# Patient Record
Sex: MALE | Race: WHITE | NOT HISPANIC OR LATINO | URBAN - METROPOLITAN AREA
[De-identification: names, ages, dates, MRNs, and addresses within clinical notes are randomized per-mention and may not be internally consistent; named-entity substitution may affect disease eponyms.]

---

## 2018-11-12 ENCOUNTER — OFFICE (OUTPATIENT)
Dept: URBAN - METROPOLITAN AREA CLINIC 19 | Facility: CLINIC | Age: 63
End: 2018-11-12

## 2018-11-12 VITALS
SYSTOLIC BLOOD PRESSURE: 141 MMHG | DIASTOLIC BLOOD PRESSURE: 77 MMHG | WEIGHT: 275 LBS | HEART RATE: 87 BPM | HEIGHT: 74 IN

## 2018-11-12 DIAGNOSIS — R16.2 HEPATOMEGALY WITH SPLENOMEGALY, NOT ELSEWHERE CLASSIFIED: ICD-10-CM

## 2018-11-12 LAB
ACTIN (SMOOTH MUSCLE) ANTIBODY: 25 UNITS — HIGH (ref 0–19)
ALPHA-1-ANTITRYPSIN PHENOTYP: ALPHA-1-ANTITRYPSIN, SERUM: 166 MG/DL (ref 90–200)
ALPHA-1-ANTITRYPSIN PHENOTYP: PHENOTYPE (PI): (no result)
ANTINUCLEAR ANTIBODIES, IFA: NEGATIVE
CERULOPLASMIN: 27.6 MG/DL (ref 16–31)
FE+TIBC+FER: FERRITIN, SERUM: 912 NG/ML — HIGH (ref 30–400)
FE+TIBC+FER: IRON BIND.CAP.(TIBC): 232 UG/DL — LOW (ref 250–450)
FE+TIBC+FER: IRON SATURATION: 40 % (ref 15–55)
FE+TIBC+FER: IRON: 93 UG/DL (ref 38–169)
FE+TIBC+FER: UIBC: 139 UG/DL (ref 111–343)
HBSAG SCREEN: NEGATIVE
HCV ANTIBODY: HEP C VIRUS AB: <0.1 S/CO RATIO
HEPATIC FUNCTION PANEL (7): ALBUMIN: 4.5 G/DL (ref 3.6–4.8)
HEPATIC FUNCTION PANEL (7): ALKALINE PHOSPHATASE: 130 IU/L — HIGH (ref 39–117)
HEPATIC FUNCTION PANEL (7): ALT (SGPT): 17 IU/L (ref 0–44)
HEPATIC FUNCTION PANEL (7): AST (SGOT): 18 IU/L (ref 0–40)
HEPATIC FUNCTION PANEL (7): BILIRUBIN, DIRECT: 0.28 MG/DL (ref 0–0.4)
HEPATIC FUNCTION PANEL (7): BILIRUBIN, TOTAL: 0.5 MG/DL (ref 0–1.2)
HEPATIC FUNCTION PANEL (7): PROTEIN, TOTAL: 7.1 G/DL (ref 6–8.5)
PROTHROMBIN TIME (PT): INR: 1 (ref 0.8–1.2)
PROTHROMBIN TIME (PT): PROTHROMBIN TIME: 11.2 SEC (ref 9.1–12)

## 2018-11-12 PROCEDURE — 99213 OFFICE O/P EST LOW 20 MIN: CPT | Performed by: INTERNAL MEDICINE

## 2018-12-12 ENCOUNTER — OFFICE (OUTPATIENT)
Dept: URBAN - METROPOLITAN AREA CLINIC 14 | Facility: CLINIC | Age: 63
End: 2018-12-12

## 2018-12-12 VITALS — HEIGHT: 74 IN

## 2018-12-12 DIAGNOSIS — R16.2 HEPATOMEGALY WITH SPLENOMEGALY, NOT ELSEWHERE CLASSIFIED: ICD-10-CM

## 2018-12-12 PROCEDURE — 91200 LIVER ELASTOGRAPHY: CPT | Performed by: INTERNAL MEDICINE

## 2019-03-18 ENCOUNTER — OFFICE (OUTPATIENT)
Dept: URBAN - METROPOLITAN AREA CLINIC 19 | Facility: CLINIC | Age: 64
End: 2019-03-18

## 2019-03-18 VITALS
WEIGHT: 276 LBS | HEART RATE: 47 BPM | DIASTOLIC BLOOD PRESSURE: 71 MMHG | HEIGHT: 74 IN | SYSTOLIC BLOOD PRESSURE: 138 MMHG

## 2019-03-18 DIAGNOSIS — K92.1 MELENA: ICD-10-CM

## 2019-03-18 DIAGNOSIS — R16.2 HEPATOMEGALY WITH SPLENOMEGALY, NOT ELSEWHERE CLASSIFIED: ICD-10-CM

## 2019-03-18 LAB
CBC, PLATELET, NO DIFFERENTIAL: HEMATOCRIT: 39.7 % (ref 37.5–51)
CBC, PLATELET, NO DIFFERENTIAL: HEMOGLOBIN: 12.7 G/DL — LOW (ref 13–17.7)
CBC, PLATELET, NO DIFFERENTIAL: MCH: 29.5 PG (ref 26.6–33)
CBC, PLATELET, NO DIFFERENTIAL: MCHC: 32 G/DL (ref 31.5–35.7)
CBC, PLATELET, NO DIFFERENTIAL: MCV: 92 FL (ref 79–97)
CBC, PLATELET, NO DIFFERENTIAL: PLATELETS: 141 X10E3/UL — LOW (ref 150–379)
CBC, PLATELET, NO DIFFERENTIAL: RBC: 4.3 X10E6/UL (ref 4.14–5.8)
CBC, PLATELET, NO DIFFERENTIAL: RDW: 16.3 % — HIGH (ref 12.3–15.4)
CBC, PLATELET, NO DIFFERENTIAL: WBC: 10.9 X10E3/UL — HIGH (ref 3.4–10.8)
HEPATIC FUNCTION PANEL (7): ALBUMIN: 4.5 G/DL (ref 3.6–4.8)
HEPATIC FUNCTION PANEL (7): ALKALINE PHOSPHATASE: 147 IU/L — HIGH (ref 39–117)
HEPATIC FUNCTION PANEL (7): ALT (SGPT): 21 IU/L (ref 0–44)
HEPATIC FUNCTION PANEL (7): AST (SGOT): 19 IU/L (ref 0–40)
HEPATIC FUNCTION PANEL (7): BILIRUBIN, DIRECT: 0.34 MG/DL (ref 0–0.4)
HEPATIC FUNCTION PANEL (7): BILIRUBIN, TOTAL: 0.5 MG/DL (ref 0–1.2)
HEPATIC FUNCTION PANEL (7): PROTEIN, TOTAL: 7.5 G/DL (ref 6–8.5)

## 2019-03-18 PROCEDURE — 99213 OFFICE O/P EST LOW 20 MIN: CPT | Performed by: INTERNAL MEDICINE

## 2019-04-10 ENCOUNTER — AMBULATORY SURGICAL CENTER (OUTPATIENT)
Dept: URBAN - METROPOLITAN AREA SURGERY 2 | Facility: SURGERY | Age: 64
End: 2019-04-10

## 2019-04-10 ENCOUNTER — AMBULATORY SURGICAL CENTER (OUTPATIENT)
Dept: URBAN - METROPOLITAN AREA SURGERY 2 | Facility: SURGERY | Age: 64
End: 2019-04-10
Payer: MEDICARE

## 2019-04-10 VITALS
DIASTOLIC BLOOD PRESSURE: 43 MMHG | HEART RATE: 71 BPM | WEIGHT: 275 LBS | RESPIRATION RATE: 18 BRPM | RESPIRATION RATE: 18 BRPM | OXYGEN SATURATION: 95 % | OXYGEN SATURATION: 97 % | DIASTOLIC BLOOD PRESSURE: 43 MMHG | HEART RATE: 63 BPM | DIASTOLIC BLOOD PRESSURE: 51 MMHG | HEART RATE: 63 BPM | SYSTOLIC BLOOD PRESSURE: 111 MMHG | SYSTOLIC BLOOD PRESSURE: 111 MMHG | DIASTOLIC BLOOD PRESSURE: 50 MMHG | SYSTOLIC BLOOD PRESSURE: 102 MMHG | TEMPERATURE: 98.6 F | DIASTOLIC BLOOD PRESSURE: 51 MMHG | TEMPERATURE: 98.6 F | OXYGEN SATURATION: 97 % | SYSTOLIC BLOOD PRESSURE: 108 MMHG | OXYGEN SATURATION: 95 % | SYSTOLIC BLOOD PRESSURE: 102 MMHG | SYSTOLIC BLOOD PRESSURE: 124 MMHG | HEART RATE: 68 BPM | HEART RATE: 61 BPM | TEMPERATURE: 97.8 F | DIASTOLIC BLOOD PRESSURE: 50 MMHG | HEIGHT: 74 IN | HEART RATE: 71 BPM | HEART RATE: 61 BPM | RESPIRATION RATE: 16 BRPM | RESPIRATION RATE: 16 BRPM | HEIGHT: 74 IN | HEART RATE: 68 BPM | SYSTOLIC BLOOD PRESSURE: 124 MMHG | SYSTOLIC BLOOD PRESSURE: 108 MMHG | WEIGHT: 275 LBS | TEMPERATURE: 97.8 F

## 2019-04-10 DIAGNOSIS — K92.1 MELENA: ICD-10-CM

## 2019-04-10 DIAGNOSIS — K31.89 OTHER DISEASES OF STOMACH AND DUODENUM: ICD-10-CM

## 2019-04-10 PROCEDURE — 43235 EGD DIAGNOSTIC BRUSH WASH: CPT | Performed by: INTERNAL MEDICINE

## 2019-04-10 PROCEDURE — G8907 PT DOC NO EVENTS ON DISCHARG: HCPCS | Performed by: INTERNAL MEDICINE

## 2019-04-10 PROCEDURE — G8918 PT W/O PREOP ORDER IV AB PRO: HCPCS | Performed by: INTERNAL MEDICINE

## 2019-10-16 ENCOUNTER — OFFICE (OUTPATIENT)
Dept: URBAN - METROPOLITAN AREA CLINIC 19 | Facility: CLINIC | Age: 64
End: 2019-10-16

## 2019-10-16 VITALS
DIASTOLIC BLOOD PRESSURE: 60 MMHG | HEART RATE: 66 BPM | WEIGHT: 276 LBS | HEIGHT: 74 IN | SYSTOLIC BLOOD PRESSURE: 122 MMHG

## 2019-10-16 DIAGNOSIS — K74.60 UNSPECIFIED CIRRHOSIS OF LIVER: ICD-10-CM

## 2019-10-16 DIAGNOSIS — K92.1 MELENA: ICD-10-CM

## 2019-10-16 LAB
AFP, SERUM, TUMOR MARKER: 0.8 NG/ML (ref 0–8.3)
CBC WITH DIFFERENTIAL/PLATELET: BASO (ABSOLUTE): 0.1 X10E3/UL (ref 0–0.2)
CBC WITH DIFFERENTIAL/PLATELET: BASOS: 1 %
CBC WITH DIFFERENTIAL/PLATELET: EOS (ABSOLUTE): 0.2 X10E3/UL (ref 0–0.4)
CBC WITH DIFFERENTIAL/PLATELET: EOS: 2 %
CBC WITH DIFFERENTIAL/PLATELET: HEMATOCRIT: 35.2 % — LOW (ref 37.5–51)
CBC WITH DIFFERENTIAL/PLATELET: HEMOGLOBIN: 11.2 G/DL — LOW (ref 13–17.7)
CBC WITH DIFFERENTIAL/PLATELET: IMMATURE GRANS (ABS): 0 X10E3/UL (ref 0–0.1)
CBC WITH DIFFERENTIAL/PLATELET: IMMATURE GRANULOCYTES: 0 %
CBC WITH DIFFERENTIAL/PLATELET: LYMPHS (ABSOLUTE): 1.2 X10E3/UL (ref 0.7–3.1)
CBC WITH DIFFERENTIAL/PLATELET: LYMPHS: 15 %
CBC WITH DIFFERENTIAL/PLATELET: MCH: 29.9 PG (ref 26.6–33)
CBC WITH DIFFERENTIAL/PLATELET: MCHC: 31.8 G/DL (ref 31.5–35.7)
CBC WITH DIFFERENTIAL/PLATELET: MCV: 94 FL (ref 79–97)
CBC WITH DIFFERENTIAL/PLATELET: MONOCYTES(ABSOLUTE): 0.7 X10E3/UL (ref 0.1–0.9)
CBC WITH DIFFERENTIAL/PLATELET: MONOCYTES: 8 %
CBC WITH DIFFERENTIAL/PLATELET: NEUTROPHILS (ABSOLUTE): 5.9 X10E3/UL (ref 1.4–7)
CBC WITH DIFFERENTIAL/PLATELET: NEUTROPHILS: 74 %
CBC WITH DIFFERENTIAL/PLATELET: PLATELETS: 128 X10E3/UL — LOW (ref 150–450)
CBC WITH DIFFERENTIAL/PLATELET: RBC: 3.74 X10E6/UL — LOW (ref 4.14–5.8)
CBC WITH DIFFERENTIAL/PLATELET: RDW: 16.7 % — HIGH (ref 12.3–15.4)
CBC WITH DIFFERENTIAL/PLATELET: WBC: 8 X10E3/UL (ref 3.4–10.8)
HEPATIC FUNCTION PANEL (7): ALBUMIN: 3.9 G/DL (ref 3.6–4.8)
HEPATIC FUNCTION PANEL (7): ALKALINE PHOSPHATASE: 190 IU/L — HIGH (ref 39–117)
HEPATIC FUNCTION PANEL (7): ALT (SGPT): 14 IU/L (ref 0–44)
HEPATIC FUNCTION PANEL (7): AST (SGOT): 14 IU/L (ref 0–40)
HEPATIC FUNCTION PANEL (7): BILIRUBIN, DIRECT: 0.47 MG/DL — HIGH (ref 0–0.4)
HEPATIC FUNCTION PANEL (7): BILIRUBIN, TOTAL: 0.6 MG/DL (ref 0–1.2)
HEPATIC FUNCTION PANEL (7): PROTEIN, TOTAL: 6.5 G/DL (ref 6–8.5)
PROTHROMBIN TIME (PT): INR: 1.2 (ref 0.8–1.2)
PROTHROMBIN TIME (PT): PROTHROMBIN TIME: 11.9 SEC (ref 9.1–12)

## 2019-10-16 PROCEDURE — 99212 OFFICE O/P EST SF 10 MIN: CPT | Performed by: INTERNAL MEDICINE

## 2019-11-13 ENCOUNTER — OFFICE (OUTPATIENT)
Dept: URBAN - METROPOLITAN AREA CLINIC 19 | Facility: CLINIC | Age: 64
End: 2019-11-13

## 2019-11-13 DIAGNOSIS — K74.60 UNSPECIFIED CIRRHOSIS OF LIVER: ICD-10-CM

## 2019-11-13 DIAGNOSIS — R16.2 HEPATOMEGALY WITH SPLENOMEGALY, NOT ELSEWHERE CLASSIFIED: ICD-10-CM

## 2019-11-13 PROCEDURE — 76700 US EXAM ABDOM COMPLETE: CPT | Mod: TC | Performed by: INTERNAL MEDICINE

## 2020-04-08 ENCOUNTER — OFFICE (OUTPATIENT)
Dept: URBAN - METROPOLITAN AREA TELEHEALTH 10 | Facility: TELEHEALTH | Age: 65
End: 2020-04-08

## 2020-04-08 VITALS — HEIGHT: 74 IN

## 2020-04-08 DIAGNOSIS — K74.60 UNSPECIFIED CIRRHOSIS OF LIVER: ICD-10-CM

## 2020-04-08 DIAGNOSIS — K92.1 MELENA: ICD-10-CM

## 2020-04-08 DIAGNOSIS — R14.0 ABDOMINAL DISTENSION (GASEOUS): ICD-10-CM

## 2020-04-08 NOTE — SERVICEHPINOTES
[facility] have bluish discoloration of the toes which prompted vascular w/u.   Recently had to have vascular surgery/procedure for lower extremity vascular insufficiency.   He otherwise has been fine other that report of intermittent episode of bloating,   usually followed by multiple BMs and relief of symptoms.

## 2020-08-05 ENCOUNTER — OFFICE (OUTPATIENT)
Dept: URBAN - METROPOLITAN AREA CLINIC 19 | Facility: CLINIC | Age: 65
End: 2020-08-05

## 2020-08-05 DIAGNOSIS — R16.2 HEPATOMEGALY WITH SPLENOMEGALY, NOT ELSEWHERE CLASSIFIED: ICD-10-CM

## 2020-08-05 DIAGNOSIS — K74.60 UNSPECIFIED CIRRHOSIS OF LIVER: ICD-10-CM

## 2020-08-05 PROCEDURE — 76705 ECHO EXAM OF ABDOMEN: CPT | Mod: TC | Performed by: INTERNAL MEDICINE

## 2020-10-22 ENCOUNTER — OFFICE (OUTPATIENT)
Dept: URBAN - METROPOLITAN AREA TELEHEALTH 10 | Facility: TELEHEALTH | Age: 65
End: 2020-10-22

## 2020-10-22 VITALS — HEIGHT: 74 IN

## 2020-10-22 DIAGNOSIS — K74.60 UNSPECIFIED CIRRHOSIS OF LIVER: ICD-10-CM

## 2020-10-22 NOTE — SERVICEHPINOTES
Mr. Baird presents for telehealth follow up of cirrhosis.  He has been doing well with no new complaints.  He denies any recent changes in his medication regimen.

## 2020-11-06 ENCOUNTER — INPATIENT HOSPITAL (OUTPATIENT)
Dept: URBAN - METROPOLITAN AREA HOSPITAL 130 | Facility: HOSPITAL | Age: 65
End: 2020-11-06

## 2020-11-06 DIAGNOSIS — R93.3 ABNORMAL FINDINGS ON DIAGNOSTIC IMAGING OF OTHER PARTS OF DI: ICD-10-CM

## 2020-11-06 DIAGNOSIS — Z86.010 PERSONAL HISTORY OF COLONIC POLYPS: ICD-10-CM

## 2020-11-09 ENCOUNTER — INPATIENT HOSPITAL (OUTPATIENT)
Dept: URBAN - METROPOLITAN AREA HOSPITAL 130 | Facility: HOSPITAL | Age: 65
End: 2020-11-09

## 2020-11-09 DIAGNOSIS — K52.9 NONINFECTIVE GASTROENTERITIS AND COLITIS, UNSPECIFIED: ICD-10-CM

## 2020-11-09 DIAGNOSIS — R93.3 ABNORMAL FINDINGS ON DIAGNOSTIC IMAGING OF OTHER PARTS OF DI: ICD-10-CM

## 2020-11-09 DIAGNOSIS — D12.2 BENIGN NEOPLASM OF ASCENDING COLON: ICD-10-CM

## 2020-11-09 PROCEDURE — 45385 COLONOSCOPY W/LESION REMOVAL: CPT | Performed by: INTERNAL MEDICINE

## 2020-11-09 PROCEDURE — 45380 COLONOSCOPY AND BIOPSY: CPT | Mod: 59 | Performed by: INTERNAL MEDICINE

## 2020-11-10 ENCOUNTER — INPATIENT HOSPITAL (OUTPATIENT)
Dept: URBAN - METROPOLITAN AREA HOSPITAL 130 | Facility: HOSPITAL | Age: 65
End: 2020-11-10

## 2020-11-10 DIAGNOSIS — D12.2 BENIGN NEOPLASM OF ASCENDING COLON: ICD-10-CM

## 2020-11-10 DIAGNOSIS — R93.3 ABNORMAL FINDINGS ON DIAGNOSTIC IMAGING OF OTHER PARTS OF DI: ICD-10-CM

## 2020-11-10 DIAGNOSIS — K52.9 NONINFECTIVE GASTROENTERITIS AND COLITIS, UNSPECIFIED: ICD-10-CM

## 2020-11-10 PROCEDURE — 99231 SBSQ HOSP IP/OBS SF/LOW 25: CPT | Performed by: INTERNAL MEDICINE

## 2020-11-11 PROCEDURE — 99232 SBSQ HOSP IP/OBS MODERATE 35: CPT | Performed by: INTERNAL MEDICINE

## 2021-06-14 ENCOUNTER — OFFICE (OUTPATIENT)
Dept: URBAN - METROPOLITAN AREA CLINIC 19 | Facility: CLINIC | Age: 66
End: 2021-06-14

## 2021-06-14 VITALS
OXYGEN SATURATION: 98 % | SYSTOLIC BLOOD PRESSURE: 103 MMHG | DIASTOLIC BLOOD PRESSURE: 57 MMHG | WEIGHT: 253 LBS | HEART RATE: 78 BPM | HEIGHT: 74 IN

## 2021-06-14 DIAGNOSIS — K55.1 CHRONIC VASCULAR DISORDERS OF INTESTINE: ICD-10-CM

## 2021-06-14 DIAGNOSIS — K55.9 VASCULAR DISORDER OF INTESTINE, UNSPECIFIED: ICD-10-CM

## 2021-06-14 DIAGNOSIS — K74.60 UNSPECIFIED CIRRHOSIS OF LIVER: ICD-10-CM

## 2021-06-14 PROCEDURE — 99214 OFFICE O/P EST MOD 30 MIN: CPT | Performed by: INTERNAL MEDICINE

## 2021-07-28 ENCOUNTER — OFFICE (OUTPATIENT)
Dept: URBAN - METROPOLITAN AREA CLINIC 19 | Facility: CLINIC | Age: 66
End: 2021-07-28

## 2021-07-28 DIAGNOSIS — K74.60 UNSPECIFIED CIRRHOSIS OF LIVER: ICD-10-CM

## 2021-07-28 DIAGNOSIS — R16.2 HEPATOMEGALY WITH SPLENOMEGALY, NOT ELSEWHERE CLASSIFIED: ICD-10-CM

## 2021-07-28 PROCEDURE — 76705 ECHO EXAM OF ABDOMEN: CPT | Mod: TC | Performed by: INTERNAL MEDICINE

## 2021-11-08 ENCOUNTER — INPATIENT HOSPITAL (OUTPATIENT)
Dept: URBAN - METROPOLITAN AREA HOSPITAL 81 | Facility: HOSPITAL | Age: 66
End: 2021-11-08

## 2021-11-08 DIAGNOSIS — K92.1 MELENA: ICD-10-CM

## 2021-11-08 DIAGNOSIS — K52.9 NONINFECTIVE GASTROENTERITIS AND COLITIS, UNSPECIFIED: ICD-10-CM

## 2021-11-08 DIAGNOSIS — R93.3 ABNORMAL FINDINGS ON DIAGNOSTIC IMAGING OF OTHER PARTS OF DI: ICD-10-CM

## 2021-11-08 PROCEDURE — 99222 1ST HOSP IP/OBS MODERATE 55: CPT | Performed by: INTERNAL MEDICINE

## 2021-11-09 ENCOUNTER — INPATIENT HOSPITAL (OUTPATIENT)
Dept: URBAN - METROPOLITAN AREA HOSPITAL 81 | Facility: HOSPITAL | Age: 66
End: 2021-11-09

## 2021-11-09 DIAGNOSIS — K92.1 MELENA: ICD-10-CM

## 2021-11-09 DIAGNOSIS — R93.3 ABNORMAL FINDINGS ON DIAGNOSTIC IMAGING OF OTHER PARTS OF DI: ICD-10-CM

## 2021-11-09 DIAGNOSIS — K29.80 DUODENITIS WITHOUT BLEEDING: ICD-10-CM

## 2021-11-09 PROCEDURE — 43239 EGD BIOPSY SINGLE/MULTIPLE: CPT | Performed by: INTERNAL MEDICINE

## 2021-11-10 ENCOUNTER — INPATIENT HOSPITAL (OUTPATIENT)
Dept: URBAN - METROPOLITAN AREA HOSPITAL 81 | Facility: HOSPITAL | Age: 66
End: 2021-11-10

## 2021-11-10 DIAGNOSIS — K55.9 VASCULAR DISORDER OF INTESTINE, UNSPECIFIED: ICD-10-CM

## 2021-11-10 DIAGNOSIS — K64.8 OTHER HEMORRHOIDS: ICD-10-CM

## 2021-11-10 DIAGNOSIS — D12.5 BENIGN NEOPLASM OF SIGMOID COLON: ICD-10-CM

## 2021-11-10 DIAGNOSIS — K57.30 DIVERTICULOSIS OF LARGE INTESTINE WITHOUT PERFORATION OR ABS: ICD-10-CM

## 2021-11-10 PROCEDURE — 45380 COLONOSCOPY AND BIOPSY: CPT | Performed by: INTERNAL MEDICINE

## 2021-12-13 ENCOUNTER — INPATIENT HOSPITAL (OUTPATIENT)
Dept: URBAN - METROPOLITAN AREA HOSPITAL 81 | Facility: HOSPITAL | Age: 66
End: 2021-12-13

## 2021-12-13 DIAGNOSIS — K92.1 MELENA: ICD-10-CM

## 2021-12-13 DIAGNOSIS — K55.9 VASCULAR DISORDER OF INTESTINE, UNSPECIFIED: ICD-10-CM

## 2021-12-13 DIAGNOSIS — R93.3 ABNORMAL FINDINGS ON DIAGNOSTIC IMAGING OF OTHER PARTS OF DI: ICD-10-CM

## 2021-12-13 PROCEDURE — 99222 1ST HOSP IP/OBS MODERATE 55: CPT | Performed by: INTERNAL MEDICINE

## 2022-03-22 ENCOUNTER — INPATIENT HOSPITAL (OUTPATIENT)
Dept: URBAN - METROPOLITAN AREA HOSPITAL 130 | Facility: HOSPITAL | Age: 67
End: 2022-03-22

## 2022-03-22 DIAGNOSIS — K63.3 ULCER OF INTESTINE: ICD-10-CM

## 2022-03-22 DIAGNOSIS — K62.5 HEMORRHAGE OF ANUS AND RECTUM: ICD-10-CM

## 2022-03-22 PROCEDURE — 99222 1ST HOSP IP/OBS MODERATE 55: CPT | Performed by: INTERNAL MEDICINE

## 2022-03-23 PROCEDURE — 45331 SIGMOIDOSCOPY AND BIOPSY: CPT | Performed by: INTERNAL MEDICINE

## 2022-03-24 ENCOUNTER — INPATIENT HOSPITAL (OUTPATIENT)
Dept: URBAN - METROPOLITAN AREA HOSPITAL 130 | Facility: HOSPITAL | Age: 67
End: 2022-03-24

## 2022-03-24 DIAGNOSIS — N18.6 END STAGE RENAL DISEASE: ICD-10-CM

## 2022-03-24 DIAGNOSIS — I25.10 ATHEROSCLEROTIC HEART DISEASE OF NATIVE CORONARY ARTERY WITH: ICD-10-CM

## 2022-03-24 DIAGNOSIS — K92.1 MELENA: ICD-10-CM

## 2022-03-24 PROCEDURE — 99231 SBSQ HOSP IP/OBS SF/LOW 25: CPT | Performed by: INTERNAL MEDICINE
